# Patient Record
Sex: MALE | Race: WHITE | NOT HISPANIC OR LATINO | Employment: OTHER | URBAN - METROPOLITAN AREA
[De-identification: names, ages, dates, MRNs, and addresses within clinical notes are randomized per-mention and may not be internally consistent; named-entity substitution may affect disease eponyms.]

---

## 2018-12-28 PROBLEM — G89.29 CHRONIC MIDLINE LOW BACK PAIN WITH BILATERAL SCIATICA: Status: ACTIVE | Noted: 2018-12-28

## 2018-12-28 PROBLEM — C43.9 MALIGNANT MELANOMA (HCC): Status: ACTIVE | Noted: 2018-12-28

## 2018-12-28 PROBLEM — M54.42 CHRONIC MIDLINE LOW BACK PAIN WITH BILATERAL SCIATICA: Status: ACTIVE | Noted: 2018-12-28

## 2018-12-28 PROBLEM — Z98.890 HISTORY OF LUMBAR SURGERY: Status: ACTIVE | Noted: 2018-12-28

## 2018-12-28 PROBLEM — R00.2 PALPITATIONS: Status: ACTIVE | Noted: 2018-12-28

## 2018-12-28 PROBLEM — I10 HYPERTENSION: Status: ACTIVE | Noted: 2018-12-28

## 2018-12-28 PROBLEM — R06.83 SNORING: Status: ACTIVE | Noted: 2018-12-28

## 2018-12-28 PROBLEM — E66.9 CLASS 2 OBESITY WITHOUT SERIOUS COMORBIDITY WITH BODY MASS INDEX (BMI) OF 35.0 TO 35.9 IN ADULT: Status: ACTIVE | Noted: 2018-12-28

## 2018-12-28 PROBLEM — R01.1 SYSTOLIC MURMUR: Status: ACTIVE | Noted: 2018-12-28

## 2018-12-28 PROBLEM — M54.41 CHRONIC MIDLINE LOW BACK PAIN WITH BILATERAL SCIATICA: Status: ACTIVE | Noted: 2018-12-28

## 2018-12-28 PROBLEM — G47.30 OBSERVED SLEEP APNEA: Status: ACTIVE | Noted: 2018-12-28

## 2019-01-02 PROBLEM — E78.5 HYPERLIPIDEMIA: Status: ACTIVE | Noted: 2019-01-02

## 2019-01-02 PROBLEM — R73.01 ELEVATED FASTING BLOOD SUGAR: Status: ACTIVE | Noted: 2019-01-02

## 2019-01-02 PROBLEM — G47.34 NOCTURNAL HYPOXIA: Status: ACTIVE | Noted: 2019-01-02

## 2019-01-02 PROBLEM — E55.9 VITAMIN D INSUFFICIENCY: Status: ACTIVE | Noted: 2019-01-02

## 2019-01-23 ENCOUNTER — OFFICE VISIT (OUTPATIENT)
Dept: CARDIOLOGY | Facility: PHYSICIAN GROUP | Age: 54
End: 2019-01-23
Payer: MEDICARE

## 2019-01-23 VITALS
WEIGHT: 282 LBS | HEIGHT: 75 IN | DIASTOLIC BLOOD PRESSURE: 82 MMHG | HEART RATE: 78 BPM | OXYGEN SATURATION: 95 % | SYSTOLIC BLOOD PRESSURE: 148 MMHG | BODY MASS INDEX: 35.06 KG/M2

## 2019-01-23 DIAGNOSIS — R00.2 PALPITATIONS: ICD-10-CM

## 2019-01-23 DIAGNOSIS — R06.83 SNORING: ICD-10-CM

## 2019-01-23 DIAGNOSIS — E78.2 MIXED HYPERLIPIDEMIA: ICD-10-CM

## 2019-01-23 DIAGNOSIS — I10 ESSENTIAL HYPERTENSION: ICD-10-CM

## 2019-01-23 DIAGNOSIS — R01.1 UNDIAGNOSED CARDIAC MURMURS: ICD-10-CM

## 2019-01-23 DIAGNOSIS — R73.01 ELEVATED FASTING BLOOD SUGAR: ICD-10-CM

## 2019-01-23 DIAGNOSIS — G47.30 OBSERVED SLEEP APNEA: ICD-10-CM

## 2019-01-23 DIAGNOSIS — C43.9 MALIGNANT MELANOMA, UNSPECIFIED SITE (HCC): ICD-10-CM

## 2019-01-23 DIAGNOSIS — E66.9 CLASS 2 OBESITY WITHOUT SERIOUS COMORBIDITY WITH BODY MASS INDEX (BMI) OF 35.0 TO 35.9 IN ADULT, UNSPECIFIED OBESITY TYPE: ICD-10-CM

## 2019-01-23 PROCEDURE — 93000 ELECTROCARDIOGRAM COMPLETE: CPT | Performed by: INTERNAL MEDICINE

## 2019-01-23 PROCEDURE — 99204 OFFICE O/P NEW MOD 45 MIN: CPT | Mod: 25 | Performed by: INTERNAL MEDICINE

## 2019-01-23 PROCEDURE — 99406 BEHAV CHNG SMOKING 3-10 MIN: CPT | Performed by: INTERNAL MEDICINE

## 2019-01-23 RX ORDER — AMLODIPINE BESYLATE 5 MG/1
5 TABLET ORAL DAILY
Qty: 90 TAB | Refills: 3 | Status: SHIPPED | OUTPATIENT
Start: 2019-01-23 | End: 2020-05-21

## 2019-01-23 ASSESSMENT — ENCOUNTER SYMPTOMS
BACK PAIN: 1
BRUISES/BLEEDS EASILY: 1

## 2019-01-23 NOTE — PROGRESS NOTES
"Subjective:   Chief Complaint:   Chief Complaint   Patient presents with   • Palpitations       Eliceo Fowler (\"Moisés\") is a 53 y.o. male who is referred by CAYDEN Chirinos for palpitations, cardiac murmur and hypertension.  Has had palpitations since age of 9, also diagnosed with murmur around same time, never had an echo.    Heart racing lasts about 30 min, jeni around 130 bmp, no other associated symptoms. Sometimes does not happen for months at a time.    He is not limited by chest pain, pressure or tightness. No significant dyspnea on exertion, orthopnea or lower extremity swelling. No significant dizziness, or presyncope/syncope. No symptoms of leg claudication.    He was just diagnosed with LUCIEN, no mask yet.    BP a little elevated today. K and TSH normal.    Tobacco user 1 PPD, about 30 years. Has HTN for many years.    Previously on a medication for HTN that caused hives, probably lisinipril per his memory.    DATA REVIEWED by me:  ECG 1-23-19  Sinus, 70,     Echo-none      Most recent labs:     1/2/2019 hemoglobin 16.6, platelets 301, sodium 139, potassium 4.4, creatinine 1, LFTs normal, total cholesterol 211, triglycerides 73, , HDL 67, TSH 1.4    Past Medical History:   Diagnosis Date   • History of chickenpox    • History of measles    • History of mumps    • Hypertension      Past Surgical History:   Procedure Laterality Date   • EYE SURGERY     • WIDE EXCISION MELANOMA, LEG, W/POSS.STSG      melanoma removed from back     Family History   Problem Relation Age of Onset   • Colon Cancer Father    • Lung Cancer Father      Social History     Social History   • Marital status:      Spouse name: Brenda Fowler   • Number of children: 1   • Years of education: N/A     Occupational History   • Not on file.     Social History Main Topics   • Smoking status: Current Every Day Smoker     Types: Cigarettes   • Smokeless tobacco: Former User   • Alcohol use 8.4 oz/week     14 Glasses of " "wine per week   • Drug use: No   • Sexual activity: Yes     Other Topics Concern   • Not on file     Social History Narrative   • No narrative on file     Allergies   Allergen Reactions   • Lisinopril      Hives       Current Outpatient Prescriptions   Medication Sig Dispense Refill   • amLODIPine (NORVASC) 5 MG Tab Take 1 Tab by mouth every day. 90 Tab 3   • tramadol (ULTRAM) 50 MG Tab Take 50 mg by mouth every four hours as needed.     • gabapentin (NEURONTIN) 600 MG tablet Take 600 mg by mouth 3 times a day.     • zolpidem (AMBIEN) 10 MG Tab Take 10 mg by mouth at bedtime as needed for Sleep.       No current facility-administered medications for this visit.        Review of Systems   Musculoskeletal: Positive for back pain.   Skin: Positive for rash.   Endo/Heme/Allergies: Bruises/bleeds easily.     All others systems reviewed and negative.     Objective:     Blood pressure 148/82, pulse 78, height 1.905 m (6' 3\"), weight (!) 127.9 kg (282 lb), SpO2 95 %. Body mass index is 35.25 kg/m².    Physical Exam   General: No acute distress. Well nourished.  HEENT: EOM grossly intact, no scleral icterus, no pharyngeal erythema.   Neck:  No JVD, no bruits, trachea midline  CVS: RRR. Normal S1, S2. 2/6 mid peaking sys murmur RSB and apex. No LE edema.  2+ radial pulses, 2+ DP pulses  Resp: CTAB. No wheezing or crackles/rhonchi. Normal respiratory effort.  Abdomen: Soft, NT, no darius hepatomegaly, obese.  MSK/Ext: No clubbing or cyanosis.  Skin: Warm and dry, no rashes.  Neurological: CN III-XII grossly intact. No focal deficits.   Psych: A&O x 3, appropriate affect, good judgement      Assessment:     1. Palpitations  EC-ECHOCARDIOGRAM COMPLETE W/O CONT    Cardiac Stress Test Treadmill Only    Holter Monitor / Event Recorder   2. Essential hypertension  EC-ECHOCARDIOGRAM COMPLETE W/O CONT   3. Snoring     4. Observed sleep apnea     5. Mixed hyperlipidemia     6. Elevated fasting blood sugar     7. Class 2 obesity without " serious comorbidity with body mass index (BMI) of 35.0 to 35.9 in adult, unspecified obesity type     8. Undiagnosed cardiac murmurs     9. Malignant melanoma, unspecified site (HCC)         Medical Decision Making:  Today's Assessment / Status / Plan:       -Treatment for LUCIEN will likely help with HTN and palpitations  -Prolonged monitoring would be ideal but his insurance will only cover Holter  -Echo for systolic murmur, could be aortic valve or other, difficult to hear  -Start amlodipine 5 mg  -We discussed smoking cessation 8 min, his entire family smokes, he has quit before but admits it will be harder with all his family smoking.  -10 year risk with smoking it 11% and needs to be on statin and ASA but risk without smoking is 5.9%.    Written instructions given today:  -Echocardiogram- heart pictures (ultrasound), look at cause of heart murmur  -Treadmill stress test, wear comfortable shoes, stop when your back hurts if that happens  -Holter monitor for 24 hours, not likely to capture events but can tell me about your electrical system in general  -Start amlodipine 5 mg, can cause constipation and mild ankle swelling  -Your 10 year risk of heart attack and stroke with smoking it 11% and you should be on cholesterol and aspirin to reduce the damage from a heart attack/stroke but risk without smoking is 5.9%, so if you quit smoking you will live longer and better.  -Exercise always help  -Results of testing should be communicated to you by 5 days after, if not, call the [place] office for your results: [phone].      Return in about 3 months (around 4/23/2019).    It is my pleasure to participate in the care of Mr. Fowler.  Please do not hesitate to contact me with questions or concerns.    Eliana Andrew MD, [facility]  Cardiologist [facility]    Please note that this dictation was created using voice recognition software. I have made every reasonable attempt to correct obvious  errors, but it is possible there are errors of grammar and possibly content that I did not discover before finalizing the note.

## 2019-01-23 NOTE — LETTER
"     [facility]   [address]  [place] [address]  Phone: [phone]  Fax: [phone]              Eliceo Fowler  1965    Encounter Date: 1/23/2019    Eliana Andrew M.D.          PROGRESS NOTE:  Subjective:   Chief Complaint:   Chief Complaint   Patient presents with   • Palpitations       Eliceo Fowler (\"Moisés\") is a 53 y.o. male who is referred by CAYDEN Chirinos for palpitations, cardiac murmur and hypertension.  Has had palpitations since age of 9, also diagnosed with murmur around same time, never had an echo.    Heart racing lasts about 30 min, jeni around 130 bmp, no other associated symptoms. Sometimes does not happen for months at a time.    He is not limited by chest pain, pressure or tightness. No significant dyspnea on exertion, orthopnea or lower extremity swelling. No significant dizziness, or presyncope/syncope. No symptoms of leg claudication.    He was just diagnosed with LUCIEN, no mask yet.    BP a little elevated today. K and TSH normal.    Tobacco user 1 PPD, about 30 years. Has HTN for many years.    Previously on a medication for HTN that caused hives, probably lisinipril per his memory.    DATA REVIEWED by me:  ECG 1-23-19  Sinus, 70,     Echo-none      Most recent labs:     1/2/2019 hemoglobin 16.6, platelets 301, sodium 139, potassium 4.4, creatinine 1, LFTs normal, total cholesterol 211, triglycerides 73, , HDL 67, TSH 1.4    Past Medical History:   Diagnosis Date   • History of chickenpox    • History of measles    • History of mumps    • Hypertension      Past Surgical History:   Procedure Laterality Date   • EYE SURGERY     • WIDE EXCISION MELANOMA, LEG, W/POSS.STSG      melanoma removed from back     Family History   Problem Relation Age of Onset   • Colon Cancer Father    • Lung Cancer Father      Social History     Social History   • Marital status:      Spouse name: Brenda" "Raffyker   • Number of children: 1   • Years of education: N/A     Occupational History   • Not on file.     Social History Main Topics   • Smoking status: Current Every Day Smoker     Types: Cigarettes   • Smokeless tobacco: Former User   • Alcohol use 8.4 oz/week     14 Glasses of wine per week   • Drug use: No   • Sexual activity: Yes     Other Topics Concern   • Not on file     Social History Narrative   • No narrative on file     Allergies   Allergen Reactions   • Lisinopril      Hives       Current Outpatient Prescriptions   Medication Sig Dispense Refill   • amLODIPine (NORVASC) 5 MG Tab Take 1 Tab by mouth every day. 90 Tab 3   • tramadol (ULTRAM) 50 MG Tab Take 50 mg by mouth every four hours as needed.     • gabapentin (NEURONTIN) 600 MG tablet Take 600 mg by mouth 3 times a day.     • zolpidem (AMBIEN) 10 MG Tab Take 10 mg by mouth at bedtime as needed for Sleep.       No current facility-administered medications for this visit.        Review of Systems   Musculoskeletal: Positive for back pain.   Skin: Positive for rash.   Endo/Heme/Allergies: Bruises/bleeds easily.     All others systems reviewed and negative.     Objective:     Blood pressure 148/82, pulse 78, height 1.905 m (6' 3\"), weight (!) 127.9 kg (282 lb), SpO2 95 %. Body mass index is 35.25 kg/m².    Physical Exam   General: No acute distress. Well nourished.  HEENT: EOM grossly intact, no scleral icterus, no pharyngeal erythema.   Neck:  No JVD, no bruits, trachea midline  CVS: RRR. Normal S1, S2. 2/6 mid peaking sys murmur RSB and apex. No LE edema.  2+ radial pulses, 2+ DP pulses  Resp: CTAB. No wheezing or crackles/rhonchi. Normal respiratory effort.  Abdomen: Soft, NT, no darius hepatomegaly, obese.  MSK/Ext: No clubbing or cyanosis.  Skin: Warm and dry, no rashes.  Neurological: CN III-XII grossly intact. No focal deficits.   Psych: A&O x 3, appropriate affect, good judgement      Assessment:     1. Palpitations  EC-ECHOCARDIOGRAM COMPLETE " W/O CONT    Cardiac Stress Test Treadmill Only    Holter Monitor / Event Recorder   2. Essential hypertension  EC-ECHOCARDIOGRAM COMPLETE W/O CONT   3. Snoring     4. Observed sleep apnea     5. Mixed hyperlipidemia     6. Elevated fasting blood sugar     7. Class 2 obesity without serious comorbidity with body mass index (BMI) of 35.0 to 35.9 in adult, unspecified obesity type     8. Undiagnosed cardiac murmurs     9. Malignant melanoma, unspecified site (HCC)         Medical Decision Making:  Today's Assessment / Status / Plan:       -Treatment for LUCIEN will likely help with HTN and palpitations  -Prolonged monitoring would be ideal but his insurance will only cover Holter  -Echo for systolic murmur, could be aortic valve or other, difficult to hear  -Start amlodipine 5 mg  -We discussed smoking cessation 8 min, his entire family smokes, he has quit before but admits it will be harder with all his family smoking.  -10 year risk with smoking it 11% and needs to be on statin and ASA but risk without smoking is 5.9%.    Written instructions given today:  -Echocardiogram- heart pictures (ultrasound), look at cause of heart murmur  -Treadmill stress test, wear comfortable shoes, stop when your back hurts if that happens  -Holter monitor for 24 hours, not likely to capture events but can tell me about your electrical system in general  -Start amlodipine 5 mg, can cause constipation and mild ankle swelling  -Your 10 year risk of heart attack and stroke with smoking it 11% and you should be on cholesterol and aspirin to reduce the damage from a heart attack/stroke but risk without smoking is 5.9%, so if you quit smoking you will live longer and better.  -Exercise always help  -Results of testing should be communicated to you by 5 days after, if not, call the [place] office for your results: [phone].      Return in about 3 months (around 4/23/2019).    It is my pleasure to participate in the care of Mr. Fowler.  Please do  not hesitate to contact me with questions or concerns.    Eliana Andrew MD, [facility]  Cardiologist [facility]    Please note that this dictation was created using voice recognition software. I have made every reasonable attempt to correct obvious errors, but it is possible there are errors of grammar and possibly content that I did not discover before finalizing the note.      Carlene Chapa, P.A.-C.  [address]  [place] [address]  VIA In Basket

## 2019-01-23 NOTE — PATIENT INSTRUCTIONS
-Echocardiogram- heart pictures (ultrasound), look at cause of heart murmur  -Treadmill stress test, wear comfortable shoes, stop when your back hurts if that happens  -Holter monitor for 24 hours, not likely to capture events but can tell me about your electrical system in general  -Start amlodipine 5 mg, can cause constipation and mild ankle swelling  -Your 10 year risk of heart attack and stroke with smoking it 11% and you should be on cholesterol and aspirin to reduce the damage from a heart attack/stroke but risk without smoking is 5.9%, so if you quit smoking you will live longer and better.  -Exercise always help  -Results of testing should be communicated to you by 5 days after, if not, call the [place] office for your results: [phone].

## 2019-02-04 ENCOUNTER — TELEPHONE (OUTPATIENT)
Dept: CARDIOLOGY | Facility: MEDICAL CENTER | Age: 54
End: 2019-02-04

## 2019-02-05 NOTE — TELEPHONE ENCOUNTER
Eliana Andrew M.D.  Daksha So, R.N.             Please let Moisés now that we need to control his blood pressure because he has mild concentric LVH, endorgan damage from his hypertension.  Otherwise his echo looked good.      Called pt to notify of echo results. Left detailed vm and encouraged to call w/questions or concerns.

## 2019-02-15 ENCOUNTER — OFFICE VISIT (OUTPATIENT)
Dept: CARDIOLOGY | Facility: CLINIC | Age: 54
End: 2019-02-15
Payer: MEDICARE

## 2019-02-15 VITALS
OXYGEN SATURATION: 95 % | BODY MASS INDEX: 34.44 KG/M2 | HEIGHT: 75 IN | WEIGHT: 277 LBS | HEART RATE: 62 BPM | SYSTOLIC BLOOD PRESSURE: 140 MMHG | DIASTOLIC BLOOD PRESSURE: 80 MMHG

## 2019-02-15 DIAGNOSIS — E78.5 DYSLIPIDEMIA: ICD-10-CM

## 2019-02-15 DIAGNOSIS — I47.10 PSVT (PAROXYSMAL SUPRAVENTRICULAR TACHYCARDIA): Chronic | ICD-10-CM

## 2019-02-15 DIAGNOSIS — I10 ESSENTIAL HYPERTENSION: ICD-10-CM

## 2019-02-15 PROCEDURE — 99214 OFFICE O/P EST MOD 30 MIN: CPT | Performed by: INTERNAL MEDICINE

## 2019-02-18 ASSESSMENT — ENCOUNTER SYMPTOMS
SORE THROAT: 0
BRUISES/BLEEDS EASILY: 0
FOCAL WEAKNESS: 0
SHORTNESS OF BREATH: 0
COUGH: 0
FEVER: 0
PND: 0
PALPITATIONS: 1
CHILLS: 0
DIZZINESS: 0
ABDOMINAL PAIN: 0
WEAKNESS: 0
BLURRED VISION: 0
CLAUDICATION: 0
NAUSEA: 0
FALLS: 0

## 2019-02-19 NOTE — PROGRESS NOTES
Chief Complaint   Patient presents with   • Palpitations       Subjective:   Eliceo Fowler is a 53 y.o. male who presents today for follow-up of recent presentation to the ER for persistent palpitations was found to have SVT finally able to capture on EKG as he presented prior to that as well he was given flecainide    He has been doing well no recurrent palpitations he is interested in ablative cure    Past Medical History:   Diagnosis Date   • History of chickenpox    • History of measles    • History of mumps    • Hypertension      Past Surgical History:   Procedure Laterality Date   • EYE SURGERY     • WIDE EXCISION MELANOMA, LEG, W/POSS.STSG      melanoma removed from back     Family History   Problem Relation Age of Onset   • Colon Cancer Father    • Lung Cancer Father      Social History     Social History   • Marital status:      Spouse name: Brenda Fowler   • Number of children: 1   • Years of education: N/A     Occupational History   • Not on file.     Social History Main Topics   • Smoking status: Current Every Day Smoker     Types: Cigarettes   • Smokeless tobacco: Former User   • Alcohol use 8.4 oz/week     14 Glasses of wine per week   • Drug use: No   • Sexual activity: Yes     Other Topics Concern   • Not on file     Social History Narrative   • No narrative on file     Allergies   Allergen Reactions   • Lisinopril      Hives     Outpatient Encounter Prescriptions as of 2/15/2019   Medication Sig Dispense Refill   • metoprolol (LOPRESSOR) 25 MG Tab Take 1 Tab by mouth 2 times a day. 60 Tab 0   • amLODIPine (NORVASC) 5 MG Tab Take 1 Tab by mouth every day. 90 Tab 3   • zolpidem (AMBIEN) 10 MG Tab Take 10 mg by mouth at bedtime as needed for Sleep.     • [DISCONTINUED] flecainide (TAMBOCOR) 50 MG tablet Take 1 Tab by mouth 2 times a day for 30 days. 60 Tab 0   • tramadol (ULTRAM) 50 MG Tab Take 50 mg by mouth every four hours as needed.     • gabapentin (NEURONTIN) 600 MG tablet Take 600  "mg by mouth 3 times a day.       No facility-administered encounter medications on file as of 2/15/2019.      Review of Systems   Constitutional: Negative for chills and fever.   HENT: Negative for sore throat.    Eyes: Negative for blurred vision.   Respiratory: Negative for cough and shortness of breath.    Cardiovascular: Positive for palpitations. Negative for chest pain, claudication, leg swelling and PND.   Gastrointestinal: Negative for abdominal pain and nausea.   Musculoskeletal: Negative for falls and joint pain.   Skin: Negative for rash.   Neurological: Negative for dizziness, focal weakness and weakness.   Endo/Heme/Allergies: Does not bruise/bleed easily.        Objective:   /80 (BP Location: Right arm, Patient Position: Sitting)   Pulse 62   Ht 1.905 m (6' 3\")   Wt (!) 125.6 kg (277 lb)   SpO2 95%   BMI 34.62 kg/m²     Physical Exam   Constitutional: No distress.   HENT:   Mouth/Throat: Oropharynx is clear and moist. No oropharyngeal exudate.   Eyes: No scleral icterus.   Neck: No JVD present.   Cardiovascular: Normal rate and normal heart sounds.  Exam reveals no gallop and no friction rub.    No murmur heard.  Pulmonary/Chest: No respiratory distress. He has no wheezes. He has no rales.   Abdominal: Soft. Bowel sounds are normal.   Musculoskeletal: He exhibits no edema.   Neurological: He is alert.   Skin: No rash noted. He is not diaphoretic.   Psychiatric: He has a normal mood and affect.     We reviewed the results from his ER EKG looks to be atrial tachycardia cannot exclude reentrant tachycardia    Assessment:     1. Dyslipidemia     2. Essential hypertension     3. PSVT (paroxysmal supraventricular tachycardia) ([facility])  REFERRAL TO CARDIAC ELECTROPHYSIOLOGY       Medical Decision Making:  Today's Assessment / Status / Plan:     It was my pleasure to meet with Mr. Fowler.    I told him to stop the flecainide continue metoprolol and he should see EP for ablative recommendations    It is " my pleasure to participate in the care of Mr. Fowler.  Please do not hesitate to contact me with questions or concerns.    Can De La O MD PhD formerly [facility]  Cardiologist [facility]    Please note that this dictation was created using voice recognition software. I have worked with consultants from the vendor as well as technical experts from [facility] to optimize the interface. I have made every reasonable attempt to correct obvious errors, but I expect that there are errors of grammar and possibly content I did not discover before finalizing the note.

## 2019-02-26 ENCOUNTER — TELEPHONE (OUTPATIENT)
Dept: CARDIOLOGY | Facility: CLINIC | Age: 54
End: 2019-02-26

## 2019-02-26 NOTE — TELEPHONE ENCOUNTER
[place] holter monitor, per pt when he gets back into town he will tomas office to se appt.

## 2019-05-21 ENCOUNTER — OFFICE VISIT (OUTPATIENT)
Dept: CARDIOLOGY | Facility: MEDICAL CENTER | Age: 54
End: 2019-05-21
Payer: MEDICARE

## 2019-05-21 ENCOUNTER — TELEPHONE (OUTPATIENT)
Dept: CARDIOLOGY | Facility: MEDICAL CENTER | Age: 54
End: 2019-05-21

## 2019-05-21 VITALS
OXYGEN SATURATION: 95 % | HEIGHT: 75 IN | HEART RATE: 72 BPM | WEIGHT: 274 LBS | DIASTOLIC BLOOD PRESSURE: 84 MMHG | BODY MASS INDEX: 34.07 KG/M2 | SYSTOLIC BLOOD PRESSURE: 152 MMHG

## 2019-05-21 DIAGNOSIS — I10 ESSENTIAL HYPERTENSION: ICD-10-CM

## 2019-05-21 DIAGNOSIS — G47.33 OBSTRUCTIVE SLEEP APNEA SYNDROME: ICD-10-CM

## 2019-05-21 DIAGNOSIS — I47.10 PSVT (PAROXYSMAL SUPRAVENTRICULAR TACHYCARDIA): ICD-10-CM

## 2019-05-21 LAB — EKG IMPRESSION: NORMAL

## 2019-05-21 PROCEDURE — 93000 ELECTROCARDIOGRAM COMPLETE: CPT | Performed by: INTERNAL MEDICINE

## 2019-05-21 PROCEDURE — 99205 OFFICE O/P NEW HI 60 MIN: CPT | Performed by: INTERNAL MEDICINE

## 2019-05-21 NOTE — PROGRESS NOTES
Arrhythmia Clinic Note (New patient)     DOS: 5/21/2019    Referring physician: Can De La O    Chief complaint/Reason for consult: SVT    HPI:  Patient is a 53 yo M. History of HTN and palpitations since he was a teenager. He says recurrent episodes almost weekly about 20-30 minutes in duration. Saw Dr. Andrew for these. Underwent ETT, not diagnostic for inducible arrhythmia along with overnight sleep study diagnosing LUCIEN now on CPAP. In Feb was having palpitations that would not resolve for about ~3 hours. Went to [facility] ED where he was found to be in narrow complex tachycardia, resolving with 6 mg IV adenosine. Sent home on BB which he says have lessened frequency of symptoms but still not resolved. He also does not want to take long term medications.    ROS (+ highlighted in red):  Constitutional: Fevers/chills/fatigue/weightloss  HEENT: Blurry vision/eye pain/sore throat/hearing loss  Respiratory: Shortness of breath/cough  Cardiovascular: Chest pain/palpitations/edema/orthopnea/syncope  GI: Nausea/vomitting/diarrhea  MSK: Arthralgias/myagias/muscle weakness  Skin: Rash/sores  Neurological: Numbness/tremors/vertigo  Endocrine: Excessive thirst/polyuria/cold intolerance/heat intolerance  Psych: Depression/anxiety    Past Medical History:   Diagnosis Date   • History of chickenpox    • History of measles    • History of mumps    • Hypertension        Past Surgical History:   Procedure Laterality Date   • EYE SURGERY     • WIDE EXCISION MELANOMA, LEG, W/POSS.STSG      melanoma removed from back       Social History     Social History   • Marital status:      Spouse name: Brenda Fowler   • Number of children: 1   • Years of education: N/A     Occupational History   • Not on file.     Social History Main Topics   • Smoking status: Current Every Day Smoker     Types: Cigarettes   • Smokeless tobacco: Former User   • Alcohol use 8.4 oz/week     14 Glasses of wine per week   • Drug use: No   • Sexual  "activity: Yes     Other Topics Concern   • Not on file     Social History Narrative   • No narrative on file       Family History   Problem Relation Age of Onset   • Colon Cancer Father    • Lung Cancer Father        Allergies   Allergen Reactions   • Lisinopril      Hives       Current Outpatient Prescriptions   Medication Sig Dispense Refill   • zolpidem (AMBIEN) 10 MG Tab Take 1 Tab by mouth at bedtime as needed for Sleep for up to 30 days. 30 Tab 2   • metoprolol (LOPRESSOR) 25 MG Tab Take 1 Tab by mouth 2 times a day. 180 Tab 0   • amLODIPine (NORVASC) 5 MG Tab Take 1 Tab by mouth every day. 90 Tab 3   • varenicline (CHANTIX STARTING MONTH DIDI) 0.5 MG X 11 & 1 MG X 42 tablet Per package instructions (Patient not taking: Reported on 5/17/2019) 56 Tab 0   • varenicline (CHANTIX CONTINUING MONTH DIDI) 1 MG tablet Take 1 Tab by mouth 2 times a day. (Patient not taking: Reported on 5/17/2019) 60 Tab 3   • tramadol (ULTRAM) 50 MG Tab Take 50 mg by mouth every four hours as needed.     • gabapentin (NEURONTIN) 600 MG tablet Take 600 mg by mouth 3 times a day.       No current facility-administered medications for this visit.        Physical Exam:  Vitals:    05/21/19 0759   BP: 152/84   BP Location: Left arm   Patient Position: Sitting   BP Cuff Size: Large adult   Pulse: 72   SpO2: 95%   Weight: 124.3 kg (274 lb)   Height: 1.905 m (6' 3\")     General appearance: NAD, conversant   Eyes: anicteric sclerae, moist conjunctivae; no lid-lag; PERRLA  HENT: Atraumatic; oropharynx clear with moist mucous membranes and no mucosal ulcerations; normal hard and soft palate  Neck: Trachea midline; FROM, supple, no thyromegaly or lymphadenopathy  Lungs: CTA, with normal respiratory effort and no intercostal retractions  CV: RRR, no MRGs, no JVD   Abdomen: Soft, non-tender; no masses or HSM  Extremities: No peripheral edema or extremity lymphadenopathy  Skin: Normal temperature, turgor and texture; no rash, ulcers or subcutaneous " nodules  Psych: Appropriate affect, alert and oriented to person, place and time    Data:  Labs reviewed    Prior echo/stress results reviewed:  No previous exam for comparison.  Left ventricular ejection fraction is visually estimated to be 70%.  Mild concentric left ventricular hypertrophy.  No significant valve abnormality.  Unable to estimate pulmonary artery pressure due to an inadequate   tricuspid regurgitant jet.  Normal inferior vena cava size and inspiratory collapse.    EKG interpreted by me:  Sinus, no pre-excitation    Outside EKG showing no RP tachycardia, regular and narrow complex at around 190 bpm    ETT tracings reviewed    Impression/Plan:  1. PSVT (paroxysmal supraventricular tachycardia) (HCC)  EKG   2. Essential hypertension     3. Obstructive sleep apnea syndrome       -Recurrent, frequent and bothersome PSVT  -Responsive to adenosine, DDx includes AVNRT/AVRT utilizing concealed AP/AT  -Reasonable to consider catheter ablation  -Risks/benefits/alternatives and details of the procedure discussed   -All his questions answered and he wants to proceed  -May need additional BP medication  -Encouraged compliance with his CPAP    Jenny Vizcaino MD

## 2019-06-24 ENCOUNTER — TELEPHONE (OUTPATIENT)
Dept: CARDIOLOGY | Facility: MEDICAL CENTER | Age: 54
End: 2019-06-24

## 2019-06-24 NOTE — TELEPHONE ENCOUNTER
"Spoke with pt. He has SVT ablation scheduled tomorrow. Per Dr. Vizcaino's instructions, he has been holding Lopressor 25mg BID for 5 days.   Pt. Does not know his pulse rate \"I haven't counted it\". When asked how he is feeling he stated,  \"I feel alright\". Pt. Said Dr. Vizcaino told him he could take a dose of Lopressor \"if my pulse got high and I was uncomfortable\" so he just took a dose.   Pt. Teaching done re; activation of EMS. Pt. Denied shortness of breath, racing heart, dizziness. He will go to [facility] if sx. Worsen.  "

## 2019-06-24 NOTE — TELEPHONE ENCOUNTER
----- Message from Marjorie Mendenhall sent at 6/24/2019  3:30 PM PDT -----  Regarding: heart rate has been high for about a hour has procedure tomorrow with SS   Contact: [phone]  VIKTORIYA/Janet     Pt reports high heart rate  for about a hour now , has not gone down. Has procedure scheduled with SS tomorrow. Pt wants to know what he should do. Please call pt to advise at [phone].

## 2019-06-25 ENCOUNTER — HOSPITAL ENCOUNTER (OUTPATIENT)
Facility: MEDICAL CENTER | Age: 54
End: 2019-06-25
Attending: INTERNAL MEDICINE | Admitting: INTERNAL MEDICINE
Payer: MEDICARE

## 2019-06-25 ENCOUNTER — APPOINTMENT (OUTPATIENT)
Dept: CARDIOLOGY | Facility: MEDICAL CENTER | Age: 54
End: 2019-06-25
Attending: INTERNAL MEDICINE
Payer: MEDICARE

## 2019-06-25 VITALS
SYSTOLIC BLOOD PRESSURE: 154 MMHG | TEMPERATURE: 98 F | DIASTOLIC BLOOD PRESSURE: 69 MMHG | HEIGHT: 75 IN | RESPIRATION RATE: 14 BRPM | OXYGEN SATURATION: 94 % | BODY MASS INDEX: 33.55 KG/M2 | HEART RATE: 61 BPM | WEIGHT: 269.84 LBS

## 2019-06-25 DIAGNOSIS — I47.10 PSVT (PAROXYSMAL SUPRAVENTRICULAR TACHYCARDIA): ICD-10-CM

## 2019-06-25 LAB
ALBUMIN SERPL BCP-MCNC: 4.4 G/DL (ref 3.2–4.9)
ALBUMIN/GLOB SERPL: 1.7 G/DL
ALP SERPL-CCNC: 46 U/L (ref 30–99)
ALT SERPL-CCNC: 42 U/L (ref 2–50)
ANION GAP SERPL CALC-SCNC: 8 MMOL/L (ref 0–11.9)
AST SERPL-CCNC: 26 U/L (ref 12–45)
BASOPHILS # BLD AUTO: 1 % (ref 0–1.8)
BASOPHILS # BLD: 0.08 K/UL (ref 0–0.12)
BILIRUB SERPL-MCNC: 0.7 MG/DL (ref 0.1–1.5)
BUN SERPL-MCNC: 12 MG/DL (ref 8–22)
CALCIUM SERPL-MCNC: 9 MG/DL (ref 8.5–10.5)
CHLORIDE SERPL-SCNC: 109 MMOL/L (ref 96–112)
CO2 SERPL-SCNC: 24 MMOL/L (ref 20–33)
CREAT SERPL-MCNC: 0.83 MG/DL (ref 0.5–1.4)
EKG IMPRESSION: NORMAL
EKG IMPRESSION: NORMAL
EOSINOPHIL # BLD AUTO: 0.24 K/UL (ref 0–0.51)
EOSINOPHIL NFR BLD: 2.9 % (ref 0–6.9)
ERYTHROCYTE [DISTWIDTH] IN BLOOD BY AUTOMATED COUNT: 46.5 FL (ref 35.9–50)
GLOBULIN SER CALC-MCNC: 2.6 G/DL (ref 1.9–3.5)
GLUCOSE SERPL-MCNC: 99 MG/DL (ref 65–99)
HCT VFR BLD AUTO: 44.5 % (ref 42–52)
HGB BLD-MCNC: 15.3 G/DL (ref 14–18)
IMM GRANULOCYTES # BLD AUTO: 0.02 K/UL (ref 0–0.11)
IMM GRANULOCYTES NFR BLD AUTO: 0.2 % (ref 0–0.9)
INR PPP: 1.09 (ref 0.87–1.13)
LYMPHOCYTES # BLD AUTO: 2.81 K/UL (ref 1–4.8)
LYMPHOCYTES NFR BLD: 33.6 % (ref 22–41)
MCH RBC QN AUTO: 32.3 PG (ref 27–33)
MCHC RBC AUTO-ENTMCNC: 34.4 G/DL (ref 33.7–35.3)
MCV RBC AUTO: 94.1 FL (ref 81.4–97.8)
MONOCYTES # BLD AUTO: 0.87 K/UL (ref 0–0.85)
MONOCYTES NFR BLD AUTO: 10.4 % (ref 0–13.4)
NEUTROPHILS # BLD AUTO: 4.34 K/UL (ref 1.82–7.42)
NEUTROPHILS NFR BLD: 51.9 % (ref 44–72)
NRBC # BLD AUTO: 0 K/UL
NRBC BLD-RTO: 0 /100 WBC
PLATELET # BLD AUTO: 251 K/UL (ref 164–446)
PMV BLD AUTO: 9.7 FL (ref 9–12.9)
POTASSIUM SERPL-SCNC: 3.8 MMOL/L (ref 3.6–5.5)
PROT SERPL-MCNC: 7 G/DL (ref 6–8.2)
PROTHROMBIN TIME: 14.3 SEC (ref 12–14.6)
RBC # BLD AUTO: 4.73 M/UL (ref 4.7–6.1)
SODIUM SERPL-SCNC: 141 MMOL/L (ref 135–145)
WBC # BLD AUTO: 8.4 K/UL (ref 4.8–10.8)

## 2019-06-25 PROCEDURE — 93621 COMP EP EVL L PAC&REC C SINS: CPT | Mod: 26 | Performed by: INTERNAL MEDICINE

## 2019-06-25 PROCEDURE — 85610 PROTHROMBIN TIME: CPT

## 2019-06-25 PROCEDURE — 700111 HCHG RX REV CODE 636 W/ 250 OVERRIDE (IP)

## 2019-06-25 PROCEDURE — 80053 COMPREHEN METABOLIC PANEL: CPT

## 2019-06-25 PROCEDURE — 93010 ELECTROCARDIOGRAM REPORT: CPT | Performed by: INTERNAL MEDICINE

## 2019-06-25 PROCEDURE — 93613 INTRACARDIAC EPHYS 3D MAPG: CPT | Performed by: INTERNAL MEDICINE

## 2019-06-25 PROCEDURE — 160002 HCHG RECOVERY MINUTES (STAT)

## 2019-06-25 PROCEDURE — 93653 COMPRE EP EVAL TX SVT: CPT | Performed by: INTERNAL MEDICINE

## 2019-06-25 PROCEDURE — 99152 MOD SED SAME PHYS/QHP 5/>YRS: CPT | Performed by: INTERNAL MEDICINE

## 2019-06-25 PROCEDURE — 700101 HCHG RX REV CODE 250

## 2019-06-25 PROCEDURE — 99153 MOD SED SAME PHYS/QHP EA: CPT

## 2019-06-25 PROCEDURE — 85025 COMPLETE CBC W/AUTO DIFF WBC: CPT

## 2019-06-25 PROCEDURE — 93005 ELECTROCARDIOGRAM TRACING: CPT | Performed by: INTERNAL MEDICINE

## 2019-06-25 PROCEDURE — 93623 PRGRMD STIMJ&PACG IV RX NFS: CPT | Mod: 26 | Performed by: INTERNAL MEDICINE

## 2019-06-25 RX ORDER — LIDOCAINE HYDROCHLORIDE 20 MG/ML
INJECTION, SOLUTION INFILTRATION; PERINEURAL
Status: COMPLETED
Start: 2019-06-25 | End: 2019-06-25

## 2019-06-25 RX ORDER — ISOPROTERENOL HYDROCHLORIDE 0.2 MG/ML
INJECTION, SOLUTION INTRAVENOUS
Status: COMPLETED
Start: 2019-06-25 | End: 2019-06-25

## 2019-06-25 RX ORDER — LABETALOL HYDROCHLORIDE 5 MG/ML
INJECTION, SOLUTION INTRAVENOUS
Status: COMPLETED
Start: 2019-06-25 | End: 2019-06-25

## 2019-06-25 RX ORDER — HEPARIN SODIUM,PORCINE 1000/ML
VIAL (ML) INJECTION
Status: COMPLETED
Start: 2019-06-25 | End: 2019-06-25

## 2019-06-25 RX ORDER — MIDAZOLAM HYDROCHLORIDE 1 MG/ML
INJECTION INTRAMUSCULAR; INTRAVENOUS
Status: COMPLETED
Start: 2019-06-25 | End: 2019-06-25

## 2019-06-25 RX ADMIN — HEPARIN SODIUM 4000 UNITS: 1000 INJECTION, SOLUTION INTRAVENOUS; SUBCUTANEOUS at 13:28

## 2019-06-25 RX ADMIN — MIDAZOLAM HYDROCHLORIDE 2 MG: 1 INJECTION, SOLUTION INTRAMUSCULAR; INTRAVENOUS at 13:29

## 2019-06-25 RX ADMIN — LABETALOL HYDROCHLORIDE 10 MG: 5 INJECTION, SOLUTION INTRAVENOUS at 14:06

## 2019-06-25 RX ADMIN — ISOPROTERENOL HYDROCHLORIDE 200 MCG: 0.2 INJECTION, SOLUTION INTRACARDIAC; INTRAMUSCULAR; INTRAVENOUS; SUBCUTANEOUS at 13:40

## 2019-06-25 RX ADMIN — LIDOCAINE HYDROCHLORIDE: 20 INJECTION, SOLUTION INFILTRATION; PERINEURAL at 13:28

## 2019-06-25 RX ADMIN — FENTANYL CITRATE 100 MCG: 50 INJECTION INTRAMUSCULAR; INTRAVENOUS at 13:41

## 2019-06-25 NOTE — DISCHARGE INSTRUCTIONS
ACTIVITY: Rest and take it easy for the first 24 hours.  A responsible adult is recommended to remain with you during that time.  It is normal to feel sleepy.  We encourage you to not do anything that requires balance, judgment or coordination.    MILD FLU-LIKE SYMPTOMS ARE NORMAL. YOU MAY EXPERIENCE GENERALIZED MUSCLE ACHES, THROAT IRRITATION, HEADACHE AND/OR SOME NAUSEA.    FOR 24 HOURS DO NOT:  Drive, operate machinery or run household appliances.  Drink beer or alcoholic beverages.   Make important decisions or sign legal documents.    SPECIAL INSTRUCTIONS: Follow up with your Cardiologist.  Resume home medications.    DIET: To avoid nausea, slowly advance diet as tolerated, avoiding spicy or greasy foods for the first day.  Add more substantial food to your diet according to your physician's instructions.  Babies can be fed formula or breast milk as soon as they are hungry.  INCREASE FLUIDS AND FIBER TO AVOID CONSTIPATION.    SURGICAL DRESSING/BATHING: Keep dressings dry for 24 hours. May remove dressings and shower after 3:00 PM on 6/26, do not need to replace dressing. Do not submerge in water or bathe for 7 days.    FOLLOW-UP APPOINTMENT:  A follow-up appointment should be arranged with your doctor; call to schedule.    You should CALL YOUR PHYSICIAN if you develop:  Fever greater than 101 degrees F.  Pain not relieved by medication, or persistent nausea or vomiting.  Excessive bleeding (blood soaking through dressing) or unexpected drainage from the wound.  Extreme redness or swelling around the incision site, drainage of pus or foul smelling drainage.  Inability to urinate or empty your bladder within 8 hours.  Problems with breathing or chest pain.    You should call 911 if you develop problems with breathing or chest pain.  If you are unable to contact your doctor or surgical center, you should go to the nearest emergency room or urgent care center.  Physician's telephone #: [phone]    If any  questions arise, call your doctor.  If your doctor is not available, please feel free to call the Surgical Center at [phone].  The Center is open Monday through Friday from 7AM to 7PM.  You can also call the HEALTH HOTLINE open 24 hours/day, 7 days/week and speak to a nurse at [phone], or toll free at [phone].    A registered nurse may call you a few days after your surgery to see how you are doing after your procedure.    MEDICATIONS: Resume taking daily medication.  Take prescribed pain medication with food.  If no medication is prescribed, you may take non-aspirin pain medication if needed.  PAIN MEDICATION CAN BE VERY CONSTIPATING.  Take a stool softener or laxative such as senokot, pericolace, or milk of magnesia if needed.    If your physician has prescribed pain medication that includes Acetaminophen (Tylenol), do not take additional Acetaminophen (Tylenol) while taking the prescribed medication.    Depression / Suicide Risk    As you are discharged from this [facility] facility, it is important to learn how to keep safe from harming yourself.    Recognize the warning signs:  · Abrupt changes in personality, positive or negative- including increase in energy   · Giving away possessions  · Change in eating patterns- significant weight changes-  positive or negative  · Change in sleeping patterns- unable to sleep or sleeping all the time   · Unwillingness or inability to communicate  · Depression  · Unusual sadness, discouragement and loneliness  · Talk of wanting to die  · Neglect of personal appearance   · Rebelliousness- reckless behavior  · Withdrawal from people/activities they love  · Confusion- inability to concentrate     If you or a loved one observes any of these behaviors or has concerns about self-harm, here's what you can do:  · Talk about it- your feelings and reasons for harming yourself  · Remove any means that you might use to hurt yourself (examples: pills, rope,  "extension cords, firearm)  · Get professional help from the community (Mental Health, Substance Abuse, psychological counseling)  · Do not be alone:Call your Safe Contact- someone whom you trust who will be there for you.  · Call your local CRISIS HOTLINE 517-6184 or 284-248-2245  · Call your local Children's Mobile Crisis Response Team [place] (289) 852-9774 or www.We Tribute  · Call the toll free National Suicide Prevention Hotlines   · National Suicide Prevention Lifeline 549-316-TEUC (2019)  [place] [facility] Network 800-SUICIDE (433-4518)    Post Angiogram Groin Care Instructions     INSTRUCTIONS  2. Examine (look and feel) the site of your incision site TODAY so you can recognize changes that should be called to your doctor (see below).  3. Avoid straining either by lifting or pulling objects for 4-5 days. Avoid lifting over 5 pounds.   4. For at least 72 hours, if you should sneeze or cough, please hold pressure over your groin area.  5. If you should begin to have oozing from the catheterization site, please hold firm pressure and call your doctor's office immediately.  6. If profuse bleeding occurs from the catheterization site, hold firm pressure and call \"[phone]\" immediately for assistance.  7. Remove bandage after 24 hours.     ACTIVITY  2. Limit activity as instructed by your doctor.  3. No driving or very limited driving with frequent stops for one week.   4. If you must take a long car ride, stop every hour and walk around the car.   5. Warm showers or baths are permitted after the bandage is removed. Avoid hot showers, baths, hot tubs, and swimming for one week.    PLEASE CALL YOUR DOCTOR IF:  1. Temperature elevation occurs.  2. Catheterization site becomes reddened or begins to drain.   3. Bruising appears to be new or not resolving. The bruise may move down your leg. This is normal.  4. The small round lump in the groin increases in size.  5. Any leg numbness, aching, or discomfort " (immediately).  6. Increasing discomfort in the leg at the insertion site.  7. Chest pains, even if relieved by Nitroglycerin.    MISCELLANEOUS INSTRUCTIONS  1. Bruising may occur as a result of heart catheterization. Some of the discoloration may travel down the leg, going from blue to green in color.  2. A small round lump under the catheterization site will remain for up to six weeks.  3. If any questions arise call your physician's office. You can also call the [facility] [employer] HOTLINE open 24 hours/day, 7 days/week and speak to a nurse at [phone], or toll free at [phone].   4. You should call 911 if you develop problems with breathing or chest pain.    FOR PROBLEMS CALL YOUR DOCTOR AT: [phone]    I acknowledge receipt and understanding of these Home Care instructions.  ·

## 2019-06-25 NOTE — OP REPORT
[facility] EP PROCEDURE NOTE    Post-Operative Diagnosis:  Same as pre-operative     Indications: PSVT refractory or not able to tolerate medical therapy     Procedure(s) Performed:   Supraventricular tachycardia ablation with Electrophysiology Study  Electroanatomical 3D mapping  CS/LA pace and record  Programmed stimulation after IV drug infusion.     Physician(s): Jenny Vizcaino M.D.     Resident/Assistant(s):     Anesthesia: Local anesthetic with moderate sedation (start time 12:59, stop time 150 PM, total dose 100 mcg IV fentanyl, 2 mg IV versed)     Specimen(s) Removed: None     Estimated Blood Loss:  20cc     Complications:  None     Procedure:     After informed written consent, the patient was brought to the EP lab in the fasting, non-sedated state. The patient was prepped and draped in the usual sterile fashion. Femoral venous access was obtained using the modified Seldinger technique. In the left femoral vein, 2 sheaths (6, 8 Fr) were inserted over 0.035” guidewires. In the right femoral vein, 2 sheaths (6, 6 Fr) were inserted over 0.035” guidewires. A quadrapolar catheter was advanced to the His position, A quadrapolar catheter was advanced to the RAA position, A quadrapolar catheter was advanced to the RVA position. A deflectable decapolar catheter was advanced to the CS position. Programmed stimulation of the right atrium, coronary sinus, and right ventricle was performed to induce arrhythmia. One 8Fr irrigated ablation catheter with 3.5 mm distal electrode (Bisoense ST SF) was inserted into an 8Fr sheath (SR0) for electroanatomical 3D mapping (CARTO3) and radiofrequency ablation. At the end of the procedure, the catheter and sheaths were removed, and hemostasis was achieved by manual compression. Following recovery from anesthesia, the patient was transferred to the PPU in good condition.     Baseline Rhythm: sinus  ms,  ms HV 54 ms. No pre-excitation.     AV Enio Function:  Wenckebach  AV  ms (dual physiology, longest AH >250 ms)  VABCL 360 ms (retrograde fast AV lyubov pathway)     Arrhythmias:  1. During atrial pacing double extra stimuli we induced typical (slow/fast) AV lyubov reentrant tachycardia:     ms, AH>>HA, simultaneous VA activation  Entrainment from the RVA was repeatedly attempted but unsuccessful due to termination, atrial pacing at the same CL showed delta AH > 70 msec, suggesting unlikely AT mechanism     Mapping:  Electroanatomical 3D (CARTO3) map was created of right atrium and coronary sinus created during sinus rhythm to sameer the position of the His, CS ostium, and the posteroseptal tricuspid annulus.      Ablation:  Radiofrequency energy was applied using 3.5 irrigated catheter, power 30W, total 9 RF applications, RF time 293 seconds at the anatomic site of the slow pathway anterior to the CS ostium and just posterior to the septal tricuspid annulus, targeting ASP potential. Automaticity (accelerated junctional rhythm) with persistent retrograde fast pathway conduction to the atrium was seen during ablation.    Post Ablation Testin. No inducible AVNRT in baseline state or during Isuprel 2 mcg/min with single and double atrial extra-stimuli (single echo beats were seen)  2.  ms,  HV 54 ms. WBCL 410 ms    Fluroscopy time: 2.6 minutes     Impressions:    1. Typical (slow/fast) AV lyubov reentrant tachycardia.  2. Successful catheter mediated ablation of slow AV lyubov pathway with elimination of AVNRT.     Plan:   1. Transfer to monitored bed  2. Bedrest x 4 hours

## 2019-06-25 NOTE — H&P
"EP Pre-procedure History and Physical    Date of service: 6/25/2019    Reason for visit/Chief complaint: SVT    HPI:   Patient is a 55 yo WM. History of adenosine sensitive SVT. Here for EP study and potential SVT ablation if appropriate.    ROS: Negative for orthopnea, PND, palpitations, chest pain    Physical Exam:  Vitals:    06/25/19 1057   BP: 154/69   Pulse: 68   Resp: 18   Temp: 36.7 °C (98.1 °F)   TempSrc: Temporal   SpO2: 95%   Weight: 122.4 kg (269 lb 13.5 oz)   Height: 1.905 m (6' 3\")     Gen: NAD, conversant  HEENT: PERRL, EOMI  LUNGS: CTA B, no w/r/r  CV: RRR, no m/r/g, no JVD  Abd: Soft, NT/ND, +BS  Ext: no edema, warm and well perfused    Labs reviewed    EKG interpreted by me:  NSR    Impression/Recs:  1)SVT    -Risks/benefits/alternatives discussed  -Questions answered  -Proceed with EP study +/- SVT ablation    Jenny Vizcaino MD    "

## 2019-06-25 NOTE — OR NURSING
1430: Patient arrived from cath lab s/p SVT ablation with RN. Bilateral groin access sites; both are clean, dry, and soft. Patient is alert and awake. Updated on POC.    1440: Spouse at bedside, updated on POC. Patient tolerating PO intake.    1805: Patient up to ambulate upon completion of bedrest. Patient has a steady gait. Bilateral groin sites remain clean, dry, and soft. Criteria met to discharge patient.    1815: Discharge paperwork reviewed with patient and spouse. Discussed activity, site care, worsening symptoms, and follow-up. Verbalized understanding. No further questions. PIV removed, tip intact.    1820: Patient escorted out in wheelchair with RN. Discharge instructions and personal belongings in possession of the patient. Copy of discharge instructions signed and placed in the chart. Patient discharged to home with spouse.

## 2019-07-16 ENCOUNTER — OFFICE VISIT (OUTPATIENT)
Dept: CARDIOLOGY | Facility: CLINIC | Age: 54
End: 2019-07-16
Payer: MEDICARE

## 2019-07-16 VITALS
HEIGHT: 75 IN | DIASTOLIC BLOOD PRESSURE: 80 MMHG | BODY MASS INDEX: 33.94 KG/M2 | WEIGHT: 273 LBS | OXYGEN SATURATION: 93 % | SYSTOLIC BLOOD PRESSURE: 168 MMHG | HEART RATE: 88 BPM

## 2019-07-16 DIAGNOSIS — R73.01 ELEVATED FASTING BLOOD SUGAR: ICD-10-CM

## 2019-07-16 DIAGNOSIS — E78.2 MIXED HYPERLIPIDEMIA: ICD-10-CM

## 2019-07-16 DIAGNOSIS — G47.33 OBSTRUCTIVE SLEEP APNEA SYNDROME: ICD-10-CM

## 2019-07-16 DIAGNOSIS — C43.9 MALIGNANT MELANOMA, UNSPECIFIED SITE (HCC): ICD-10-CM

## 2019-07-16 DIAGNOSIS — I47.10 PSVT (PAROXYSMAL SUPRAVENTRICULAR TACHYCARDIA): ICD-10-CM

## 2019-07-16 DIAGNOSIS — R06.83 SNORING: ICD-10-CM

## 2019-07-16 DIAGNOSIS — E66.9 CLASS 2 OBESITY WITHOUT SERIOUS COMORBIDITY WITH BODY MASS INDEX (BMI) OF 35.0 TO 35.9 IN ADULT, UNSPECIFIED OBESITY TYPE: ICD-10-CM

## 2019-07-16 DIAGNOSIS — I10 ESSENTIAL HYPERTENSION: ICD-10-CM

## 2019-07-16 DIAGNOSIS — R00.2 PALPITATIONS: ICD-10-CM

## 2019-07-16 PROCEDURE — 99406 BEHAV CHNG SMOKING 3-10 MIN: CPT | Performed by: INTERNAL MEDICINE

## 2019-07-16 PROCEDURE — 99214 OFFICE O/P EST MOD 30 MIN: CPT | Mod: 25 | Performed by: INTERNAL MEDICINE

## 2019-07-16 RX ORDER — CARVEDILOL 12.5 MG/1
12.5 TABLET ORAL 2 TIMES DAILY WITH MEALS
Qty: 60 TAB | Refills: 1 | Status: SHIPPED | OUTPATIENT
Start: 2019-07-16 | End: 2020-05-21 | Stop reason: SDUPTHER

## 2019-07-16 RX ORDER — ZOLPIDEM TARTRATE 10 MG/1
TABLET ORAL
Refills: 0 | COMMUNITY
Start: 2019-06-22 | End: 2019-08-20

## 2019-07-16 ASSESSMENT — ENCOUNTER SYMPTOMS
BRUISES/BLEEDS EASILY: 1
BACK PAIN: 1

## 2019-07-16 NOTE — PROGRESS NOTES
"Subjective:   Chief Complaint:   Chief Complaint   Patient presents with   • Palpitations       Eliceo Fowler (\"Moisés\") is a 53 y.o. male who returns for palpitations, SVT, cardiac murmur and hypertension.  Has had palpitations since age of 9, also diagnosed with murmur around same time.  Echocardiogram shows EF of 70%, mild concentric LVH.  Had a treadmill stress test with Gio protocol, had hypertensive blood pressure response with no concerning changes and no rhythm changes.    Had SVT in the emergency department and required adenosine to convert.  Was started on flecainide and referred to EP for ablation.  Underwent ablation on 6/25/2019 successfully.    He is not limited by chest pain, pressure or tightness.   No significant dyspnea on exertion, orthopnea or lower extremity swelling.   No significant dizziness, or presyncope/syncope.   No symptoms of leg claudication.  Does have some brief palpitations, but short, sound like PVCs or PACs.    He was just diagnosed with LUCIEN, wearing BiPAP.    BP remains elevated, have hypertensive blood pressure response to stress and mild LVH on echo.   He wants to avoid meds that require blood work.    Tobacco user 1 PPD, about 30 years. He has not been able to quit.    Previously on a medication for HTN that caused hives, probably lisinipril per his memory.    [place] in the next view months.    DATA REVIEWED by me:  EKG 6/25/2019  Sinus, rate 58    ECG 1-23-19  Sinus, 72    Echo-1/31/2019  Left ventricular ejection fraction is visually estimated to be 70%.  Mild concentric left ventricular hypertrophy.  No significant valve abnormality.  Unable to estimate pulmonary artery pressure due to an inadequate   tricuspid regurgitant jet.  Normal inferior vena cava size and inspiratory collapse.    Treadmill stress test 1/31/2019  Gio, achieved target heart rate, hypertensive blood pressure response, documented at 218 systolic, no concerning ECG changes " "or rhythm changes.    EP procedure 6/25/2019  Impressions:    1. Typical (slow/fast) AV lyubov reentrant tachycardia.  2. Successful catheter mediated ablation of slow AV lyubov pathway with elimination of AVNRT.    Most recent labs:     6/25/2019 hemoglobin 15.3, platelets 251, sodium 141, potassium 3.8, creatinine 0.83, LFTs normal    1/2/2019 hemoglobin 16.6, platelets 301, sodium 139, potassium 4.4, creatinine 1, LFTs normal, total cholesterol 211, triglycerides 73, , HDL 67, TSH 1.4    Past Medical History:   Diagnosis Date   • Arrhythmia     SVT   • Cancer (HCC)     melanoma   • Eczema    • History of chickenpox    • History of measles    • History of mumps    • Hypertension    • Pain     chronic back pain     Past Surgical History:   Procedure Laterality Date   • EYE SURGERY Bilateral 2007    \"eyelid   • WIDE EXCISION MELANOMA, LEG, W/POSS.STSG      melanoma removed from back     Family History   Problem Relation Age of Onset   • Colon Cancer Father    • Lung Cancer Father      Social History     Social History   • Marital status:      Spouse name: Brenda Fowler   • Number of children: 1   • Years of education: N/A     Occupational History   • Not on file.     Social History Main Topics   • Smoking status: Current Every Day Smoker     Packs/day: 0.25     Years: 20.00     Types: Cigarettes   • Smokeless tobacco: Former User   • Alcohol use 8.4 oz/week     14 Glasses of wine per week      Comment: 1 glass per day   • Drug use: No   • Sexual activity: Yes     Other Topics Concern   • Not on file     Social History Narrative   • No narrative on file     Allergies   Allergen Reactions   • Lisinopril Hives     Hives   • Other Misc Rash     Rash with bandaids only       Current Outpatient Prescriptions   Medication Sig Dispense Refill   • zolpidem (AMBIEN) 10 MG Tab   0   • carvedilol (COREG) 12.5 MG Tab Take 1 Tab by mouth 2 times a day, with meals. 60 Tab 1   • amLODIPine (NORVASC) 5 MG Tab Take 1 Tab " "by mouth every day. 90 Tab 3   • tramadol (ULTRAM) 50 MG Tab Take 50 mg by mouth every four hours as needed.     • gabapentin (NEURONTIN) 600 MG tablet Take 600 mg by mouth every 6 hours as needed.       No current facility-administered medications for this visit.        Review of Systems   Musculoskeletal: Positive for back pain.   Skin: Positive for rash.   Endo/Heme/Allergies: Bruises/bleeds easily.     All others systems reviewed and negative.     Objective:     BP (!) 168/80 (BP Location: Right arm, Patient Position: Sitting)   Pulse 88   Ht 1.905 m (6' 3\")   Wt 123.8 kg (273 lb)   SpO2 93%  Body mass index is 34.12 kg/m².    Physical Exam   General: No acute distress. Well nourished.  HEENT: EOM grossly intact, no scleral icterus, no pharyngeal erythema.   Neck:  No JVD, no bruits, trachea midline  CVS: RRR. Normal S1, S2. 1/6 mid peaking sys murmur RSB and apex. No LE edema.  2+ radial pulses, 2+ DP pulses  Resp: CTAB. No wheezing or crackles/rhonchi. Normal respiratory effort.  Abdomen: Soft, NT, no darius hepatomegaly, obese.  MSK/Ext: No clubbing or cyanosis.  Skin: Warm and dry, no rashes.  Neurological: CN III-XII grossly intact. No focal deficits.   Psych: A&O x 3, appropriate affect, good judgement    Physical exam performed today and unchanged compared to 1-23-19.      Assessment:     1. PSVT (paroxysmal supraventricular tachycardia) (HCC)     2. Essential hypertension     3. Obstructive sleep apnea syndrome      BiPAP   4. Palpitations     5. Snoring     6. Mixed hyperlipidemia     7. Malignant melanoma, unspecified site (HCC)      Removed from back   8. Class 2 obesity without serious comorbidity with body mass index (BMI) of 35.0 to 35.9 in adult, unspecified obesity type     9. Elevated fasting blood sugar         Medical Decision Making:  Today's Assessment / Status / Plan:       -Needs BP control, try coreg  -We discussed smoking cessation 6 min, his entire family smokes, he has quit before " but admits it will be harder with all his family smoking.  He is having a hard time quitting.  -10 year risk with smoking it 11% and needs to be on statin and ASA but risk without smoking is 5.9%.  -Avoid meds that require lab work FU for cost    Written instructions given today:    -Carvedilol 12.5 mg AM and PM, stay on your amlodipine as well.  -Goal blood pressure is to be under 130/80.  -If you BP is high at your next visit, I will ask you to increase you Carvedilol to 25 mg AM and PM (take 2 of the 12.5 mg twice daily)  -If you have dizziness/lightheadedness/fatigue, cut the pill in half (6.25 mg AM and PM) and call the office  -When we have on a steady dose, we can change to a 90 day supply, call the office  -[phone]  -If you feel like smoking cessation is not realistic right now, I would recommend low dose statin therapy (atorvastatin) to reduce risk of heart attack or stroke.      Return Blood pressure check 4 weeks with MA, 6 months with MD.    It is my pleasure to participate in the care of Mr. Fowler.  Please do not hesitate to contact me with questions or concerns.    Eliana Andrew MD, [facility]  Cardiologist [facility]    Please note that this dictation was created using voice recognition software. I have made every reasonable attempt to correct obvious errors, but it is possible there are errors of grammar and possibly content that I did not discover before finalizing the note.

## 2019-07-16 NOTE — PATIENT INSTRUCTIONS
-Carvedilol 12.5 mg AM and PM, stay on your amlodipine as well.  -Goal blood pressure is to be under 130/80.  -If you BP is high at your next visit, I will ask you to increase you Carvedilol to 25 mg AM and PM (take 2 of the 12.5 mg twice daily)  -If you have dizziness/lightheadedness/fatigue, cut the pill in half (6.25 mg AM and PM) and call the office  -When we have on a steady dose, we can change to a 90 day supply, call the office  -[phone]  -If you feel like smoking cessation is not realistic right now, I would recommend low dose statin therapy (atorvastatin) to reduce risk of heart attack or stroke.

## 2019-08-09 ENCOUNTER — NON-PROVIDER VISIT (OUTPATIENT)
Dept: CARDIOLOGY | Facility: CLINIC | Age: 54
End: 2019-08-09
Payer: MEDICARE

## 2019-08-09 ENCOUNTER — TELEPHONE (OUTPATIENT)
Dept: CARDIOLOGY | Facility: MEDICAL CENTER | Age: 54
End: 2019-08-09

## 2019-08-09 VITALS — SYSTOLIC BLOOD PRESSURE: 162 MMHG | DIASTOLIC BLOOD PRESSURE: 90 MMHG | HEART RATE: 72 BPM | OXYGEN SATURATION: 95 %

## 2019-08-09 NOTE — TELEPHONE ENCOUNTER
M.AAllyn Called to report patient came in for BP check, reports:     162/90 HR 72 (in flow sheet)    However, patient was not taking Carvedilol BID.  He was only taking Rx one time in AM.  M.A. Instructed patient to take medication as prescribed BID and come back in one week for another BP check.     Agree with instruction.     NEIL GARRETT

## 2019-08-16 ENCOUNTER — NON-PROVIDER VISIT (OUTPATIENT)
Dept: CARDIOLOGY | Facility: CLINIC | Age: 54
End: 2019-08-16
Payer: MEDICARE

## 2019-08-16 VITALS — OXYGEN SATURATION: 96 % | DIASTOLIC BLOOD PRESSURE: 80 MMHG | HEART RATE: 70 BPM | SYSTOLIC BLOOD PRESSURE: 140 MMHG

## 2019-08-20 PROBLEM — R73.02 IMPAIRED GLUCOSE TOLERANCE: Status: ACTIVE | Noted: 2019-01-02

## 2019-08-20 PROBLEM — R06.83 SNORING: Status: RESOLVED | Noted: 2018-12-28 | Resolved: 2019-08-20

## 2019-08-20 PROBLEM — R00.2 PALPITATIONS: Status: RESOLVED | Noted: 2018-12-28 | Resolved: 2019-08-20

## 2019-08-21 ENCOUNTER — TELEPHONE (OUTPATIENT)
Dept: CARDIOLOGY | Facility: MEDICAL CENTER | Age: 54
End: 2019-08-21

## 2019-08-21 NOTE — TELEPHONE ENCOUNTER
----- Message from Eliana Andrew M.D. sent at 8/16/2019 10:32 AM PDT -----  Regarding: RE: B/P CHECK    Thanks Mara!  Well done as always.  L    ----- Message -----  From: Lottie Mendenhall, Med Ass't  Sent: 8/16/2019   8:03 AM PDT  To: Eliana Andrew M.D., Lyubov Patrick, R.N.                                  PT is now taking carvedilol 12.5 MG Tab twice a day instead of once a day.  He plans on quitting smoking in the next week while he travels and will come back in for a b/p check then to see in b/p is getting better.  No appointment made yet, pt will call to set appt.  Mara

## 2021-01-21 PROBLEM — G47.00 INSOMNIA: Status: ACTIVE | Noted: 2021-01-21

## 2021-01-21 PROBLEM — Z72.0 CURRENTLY ATTEMPTING TO QUIT SMOKING: Status: ACTIVE | Noted: 2021-01-21

## 2021-01-21 PROBLEM — D75.89 MACROCYTOSIS WITHOUT ANEMIA: Status: ACTIVE | Noted: 2021-01-21

## 2021-01-21 PROBLEM — E66.01 CLASS 2 SEVERE OBESITY WITH SERIOUS COMORBIDITY AND BODY MASS INDEX (BMI) OF 35.0 TO 35.9 IN ADULT (HCC): Status: ACTIVE | Noted: 2018-12-28
